# Patient Record
Sex: FEMALE | Race: BLACK OR AFRICAN AMERICAN | URBAN - METROPOLITAN AREA
[De-identification: names, ages, dates, MRNs, and addresses within clinical notes are randomized per-mention and may not be internally consistent; named-entity substitution may affect disease eponyms.]

---

## 2018-02-19 ENCOUNTER — OFFICE VISIT (OUTPATIENT)
Dept: MIDWIFE SERVICES | Age: 28
End: 2018-02-19

## 2018-02-19 VITALS
WEIGHT: 172.5 LBS | DIASTOLIC BLOOD PRESSURE: 81 MMHG | BODY MASS INDEX: 27.72 KG/M2 | HEIGHT: 66 IN | SYSTOLIC BLOOD PRESSURE: 138 MMHG | HEART RATE: 82 BPM

## 2018-02-19 DIAGNOSIS — N63.0 LUMP IN FEMALE BREAST: Primary | ICD-10-CM

## 2018-02-19 NOTE — PROGRESS NOTES
[facility] presents for evaluation of a lump she found in her right breast.  She noticed it first on Thursday because the area was tender. She is currently using condoms for birth control and is happy with that method. [facility] was unable to find mass during the appointment. Breasts very fibrous. Small lump found on right breast at 8'oclock. Likely small cyst.    US scheduled  Return with any further concerns.

## 2018-02-19 NOTE — PROGRESS NOTES
Chief Complaint   Patient presents with    Breast Mass     right     Blood pressure 138/81, pulse 82, height 5' 6\" (1.676 m), weight 172 lb 8 oz (78.2 kg), last menstrual period 01/22/2018. 1. Have you been to the ER, urgent care clinic since your last visit? Hospitalized since your last visit? No    2. Have you seen or consulted any other health care providers outside of the [address] [place] [address] since your last visit? Include any pap smears or colon screening.  No     Here today for right breast lump

## 2018-02-19 NOTE — MR AVS SNAPSHOT
[address] [address] [address] [phone] Patient: Liliana Stanley MRN: [id number] Gerardo Darden Visit Information Date & Time Provider Department Dept. Phone Encounter #  
 2/19/2018  3:00 PM [facility] [address] [id number] Upcoming Health Maintenance Date Due  
 HPV AGE 9Y-34Y (1 of 3 - Female 3 Dose Series) 8/18/2002 Influenza Age 5 to Adult 8/1/2017 PAP AKA CERVICAL CYTOLOGY 3/19/2018 Allergies as of 2/19/2018  Review Complete On: 2/19/2018 By: Valeri Palacios RN Severity Noted Reaction Type Reactions Flagyl [Metronidazole] Medium 03/12/2013   Systemic Rash Current Immunizations  Never Reviewed No immunizations on file. Not reviewed this visit You Were Diagnosed With   
  
 Codes Comments Lump in female breast    -  Primary ICD-10-CM: N63.0 ICD-9-CM: 611.72 Vitals BP Pulse Height(growth percentile) Weight(growth percentile) LMP BMI  
 138/81 82 5' 6\" (1.676 m) 172 lb 8 oz (78.2 kg) 01/22/2018 (Approximate) 27.84 kg/m2 OB Status Smoking Status Unknown Current Every Day Smoker BMI and BSA Data Body Mass Index Body Surface Area  
 27.84 kg/m 2 1.91 m 2 Preferred Pharmacy Pharmacy Name Phone [id number], [address] [place] [address] Your Updated Medication List  
  
   
This list is accurate as of: 2/19/18  3:51 PM.  Always use your most recent med list.  
  
  
  
  
 ALBUTEROL IN Take  by inhalation. betamethasone valerate 0.1 % ointment Commonly known as:  Elsa Kenning Apply  to affected area two (2) times a day. CLARITIN 10 mg tablet Generic drug:  loratadine Take 10 mg by mouth daily. FLONASE 50 mcg/actuation nasal spray Generic drug:  fluticasone  
nightly. SYMBICORT 160-4.5 mcg/actuation Hfaa Generic drug:  budesonide-formoterol Take 2 Puffs by inhalation two (2) times a day. To-Do List   
 02/26/2018 Imaging:  US BREAST RT COMPLETE 4 QUAD Introducing [facility]! Dear Emre: Thank you for requesting a afterBOT account. Our records indicate that you already have an active afterBOT account. You can access your account anytime at [email or web address] Did you know that you can access your hospital and ER discharge instructions at any time in afterBOT? You can also review all of your test results from your hospital stay or ER visit. Additional Information If you have questions, please visit the Frequently Asked Questions section of the afterBOT website at [email or web address] Remember, afterBOT is NOT to be used for urgent needs. For medical emergencies, dial 911. Now available from your iPhone and Android! Please provide this summary of care documentation to your next provider. If you have any questions after today's visit, please call [phone].

## 2019-04-15 ENCOUNTER — OFFICE VISIT (OUTPATIENT)
Dept: PRIMARY CARE CLINIC | Age: 29
End: 2019-04-15

## 2019-04-15 VITALS
BODY MASS INDEX: 28.51 KG/M2 | RESPIRATION RATE: 16 BRPM | HEART RATE: 69 BPM | TEMPERATURE: 98.2 F | HEIGHT: 66 IN | WEIGHT: 177.4 LBS | OXYGEN SATURATION: 99 % | DIASTOLIC BLOOD PRESSURE: 86 MMHG | SYSTOLIC BLOOD PRESSURE: 128 MMHG

## 2019-04-15 DIAGNOSIS — Z23 NEED FOR DIPHTHERIA-TETANUS-PERTUSSIS (TDAP) VACCINE: ICD-10-CM

## 2019-04-15 DIAGNOSIS — M06.9 RHEUMATOID ARTHRITIS INVOLVING BOTH KNEES, UNSPECIFIED RHEUMATOID FACTOR PRESENCE: Primary | ICD-10-CM

## 2019-04-15 DIAGNOSIS — J30.89 ENVIRONMENTAL AND SEASONAL ALLERGIES: ICD-10-CM

## 2019-04-15 DIAGNOSIS — Z12.4 CERVICAL CANCER SCREENING: ICD-10-CM

## 2019-04-15 DIAGNOSIS — J45.20 MILD INTERMITTENT ASTHMA WITHOUT COMPLICATION: ICD-10-CM

## 2019-04-15 DIAGNOSIS — Z20.2 STD EXPOSURE: ICD-10-CM

## 2019-04-15 DIAGNOSIS — Z00.00 PHYSICAL EXAM: ICD-10-CM

## 2019-04-15 RX ORDER — ALBUTEROL SULFATE 90 UG/1
1 AEROSOL, METERED RESPIRATORY (INHALATION)
Qty: 1 INHALER | Refills: 0 | Status: SHIPPED | OUTPATIENT
Start: 2019-04-15 | End: 2019-05-15

## 2019-04-15 NOTE — PROGRESS NOTES
Visit Vitals /86 (BP 1 Location: Left arm, BP Patient Position: Sitting) Pulse 69 Temp 98.2 °F (36.8 °C) (Oral) Resp 16 Ht 5' 6\" (1.676 m) Wt 177 lb 6.4 oz (80.5 kg) SpO2 99% BMI 28.63 kg/m² Chief Complaint Patient presents with [address] [place] [address] [facility] Needs a new PCP  Other Needs a referral to Rheumatology  Exposure to STD Patient isn't sure if her boyfriend is STD Free 1. Have you been to the ER, urgent care clinic since your last visit? Hospitalized since your last visit? Denies 2. Have you seen or consulted any other health care providers outside of the [address] [place] [address] since your last visit? Include any pap smears or colon screening. Denies

## 2019-04-15 NOTE — PROGRESS NOTES
Written by Chase Andujar, as dictated by Dr. Ron Rosas MD. 
 
[address] [facility] Ute Clark is a 33 y.o. female. HPI The patient comes in today to establish care. Patient would like to be checked for STDs as she recently broke up with her boyfriend and is concerned about STDs She has a HX of RA and was diagnosed with juvenile RA at 11-7 years old. Pt had been under the care of rheumatology at [facility]. She was on enbrel , steroids, but has stopped taking medication and is not seeing a rheumatologist at this time. She has been having stiff joints, especially in the morning. Pt has a HX of asthma, which usually flares up around this time of year. She uses albuterol inhaler as needed. She is not taking anything on a daily basis. She does take OTC Allegra or Zyrtec. She would also like to have her blood work done as she is fasting and wants physical exam as well. Patient would like to establish with a new OB/GYN for her pap smear. Patient Active Problem List  
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  budesonide-formoterol (SYMBICORT) 160-4.5 mcg/actuation HFA inhaler Take 2 Puffs by inhalation two (2) times a day.  loratadine (CLARITIN) 10 mg tablet Take 10 mg by mouth daily.  betamethasone valerate (VALISONE) 0.1 % ointment Apply  to affected area two (2) times a day. 15 g 0 No current facility-administered medications on file prior to visit. Allergies Allergen Reactions  Flagyl [Metronidazole] Rash Past Medical History:  
Diagnosis Date  Arthritis  Asthma Past Surgical History:  
Procedure Laterality Date  HX CYSTECTOMY  2012 Family History Problem Relation Age of Onset  Hypertension Father  Asthma Sister  Asthma Brother  Cancer Maternal Grandmother  Breast Cancer Paternal Grandmother Social History Socioeconomic History  Marital status: SINGLE Spouse name: Not on file  Number of children: Not on file  Years of education: Not on file  Highest education level: Not on file Occupational History  Not on file Social Needs  Financial resource strain: Not on file  Food insecurity:  
  Worry: Not on file Inability: Not on file  Transportation needs:  
  Medical: Not on file Non-medical: Not on file Tobacco Use  Smoking status: Current Every Day Smoker Packs/day: 0.25 Years: 6.00 Pack years: 1.50  Smokeless tobacco: Never Used  Tobacco comment: Uses marijuana daily Substance and Sexual Activity  Alcohol use: Yes Alcohol/week: 1.2 - 1.8 oz Types: 2 - 3 Standard drinks or equivalent per week Comment: occassionally  Drug use: Yes Types: Marijuana  Sexual activity: Yes  
  Partners: Male Birth control/protection: None, Condom Lifestyle  Physical activity:  
  Days per week: Not on file Minutes per session: Not on file  Stress: Not on file Relationships  Social connections:  
  Talks on phone: Not on file Gets together: Not on file Attends Mu-ism service: Not on file Active member of club or organization: Not on file Attends meetings of clubs or organizations: Not on file Relationship status: Not on file  Intimate partner violence:  
  Fear of current or ex partner: Not on file Emotionally abused: Not on file Physically abused: Not on file Forced sexual activity: Not on file Other Topics Concern  Not on file Social History Narrative  Not on file Review of Systems Constitutional: Negative for malaise/fatigue. HENT: Negative for congestion. Eyes: Negative for blurred vision and pain. Respiratory: Negative for cough and shortness of breath. Cardiovascular: Negative for chest pain and palpitations. Gastrointestinal: Negative for abdominal pain and heartburn. Genitourinary: Negative for frequency and urgency. Musculoskeletal: Positive for joint pain. Negative for myalgias. Neurological: Negative for dizziness, tingling, sensory change, weakness and headaches. Endo/Heme/Allergies: Positive for environmental allergies. Psychiatric/Behavioral: Negative for depression, memory loss and substance abuse. Visit Vitals /86 (BP 1 Location: Left arm, BP Patient Position: Sitting) Pulse 69 Temp 98.2 °F (36.8 °C) (Oral) Resp 16 Ht 5' 6\" (1.676 m) Wt 177 lb 6.4 oz (80.5 kg) LMP 03/18/2019 SpO2 99% BMI 28.63 kg/m² Physical Exam  
Constitutional: She is oriented to person, place, and time. She appears well-developed and well-nourished. No distress. HENT:  
Right Ear: External ear normal.  
Left Ear: External ear normal.  
Eyes: Conjunctivae and EOM are normal. Right eye exhibits no discharge. Left eye exhibits no discharge. Neck: Normal range of motion. Neck supple. Cardiovascular: Normal rate and regular rhythm. Pulses: 
     Dorsalis pedis pulses are 2+ on the right side, and 2+ on the left side. Pulmonary/Chest: Effort normal and breath sounds normal. She has no wheezes. Abdominal: Soft. Bowel sounds are normal. There is no tenderness. Lymphadenopathy:  
  She has no cervical adenopathy. Neurological: She is alert and oriented to person, place, and time. Reflex Scores: 
     Patellar reflexes are 2+ on the right side and 2+ on the left side. Skin: She is not diaphoretic. Psychiatric: She has a normal mood and affect. Her behavior is normal.  
Nursing note and vitals reviewed. ASSESSMENT and PLAN 
  ICD-10-CM ICD-9-CM 1. Rheumatoid arthritis involving both knees, unspecified rheumatoid factor presence ([facility] M06.9 714.0 REFERRAL TO RHEUMATOLOGY Referred to rheumatology. Told her that she will most likely have blood work done before she starts new therapy. She should make an appointment. 2. Need for diphtheria-tetanus-pertussis (Tdap) vaccine Z23 V06.1 diph,Pertuss,Acell,,Tet Vac-PF (ADACEL) 2 Lf-(2.5-5-3-5 mcg)-5Lf/0.5 mL susp prescription given. Tdap ordered. Last Tdap was when she was in high school. 3. Mild intermittent asthma without complication H19.28 441.43 albuterol (PROVENTIL HFA, VENTOLIN HFA, PROAIR HFA) 90 mcg/actuation inhaler sent to pharmacy. Albuterol inhaler given, which she should use as needed. Will check her for allergies. 4. Environmental and seasonal allergies J30.89 477.8 ALLERGEN (24) PANEL  
   ALLERGEN (24) PANEL Told her to take OTC Zyrtec or Claritin daily. If she still has issues, I would suggest taking Singulair. 5. Physical exam B03.91 R52.9 METABOLIC PANEL, COMPREHENSIVE  
   CBC W/O DIFF  
   LIPID PANEL  
   TSH 3RD GENERATION  
   VITAMIN D, 25 HYDROXY Complete physical exam done. Basic fasting labs drawn. 6. STD exposure Z20.2 V01.6 HIV 1/2 AG/AB, 4TH GENERATION,W RFLX CONFIRM  
   CT+NG+M GENITALIUM BY YOUNG, UR  
   RPR W/REFLEX TITER AND TREPONEMA ABS Will check for STDs. 7. Cervical cancer screening Z12.4 V76.2 REFERRAL TO OBSTETRICS AND GYNECOLOGY Referred to OB/GYN. This plan was reviewed with the patient and patient agrees. All questions were answered. This scribe documentation was reviewed by me and accurately reflects the examination and decisions made by me. This note will not be viewable in [address]

## 2019-04-21 LAB
A ALTERNATA IGE QN: 0.72 KU/L
A FUMIGATUS IGE QN: 0.15 KU/L
BERMUDA GRASS IGE QN: 1.56 KU/L
BOXELDER IGE QN: 0.48 KU/L
C HERBARUM IGE QN: 0.49 KU/L
CAT DANDER IGE QN: 10.4 KU/L
CMN PIGWEED IGE QN: 0.64 KU/L
COMMON RAGWEED IGE QN: 1 KU/L
COTTONWOOD IGE QN: 0.73 KU/L
D FARINAE IGE QN: <0.1 KU/L
D PTERONYSS IGE QN: <0.1 KU/L
DOG DANDER IGE QN: 6.59 KU/L
IGE SERPL-ACNC: 100 IU/ML (ref 6–495)
JOHNSON GRASS IGE QN: 2.15 KU/L
Lab: ABNORMAL
MOUSE URINE PROT IGE QN: <0.1 KU/L
MT JUNIPER IGE QN: 1.35 KU/L
P NOTATUM IGE QN: <0.1 KU/L
PECAN/HICK TREE IGE QN: 0.46 KU/L
ROACH IGE QN: 0.14 KU/L
SHEEP SORREL IGE QN: 0.35 KU/L
SILVER BIRCH IGE QN: 0.64 KU/L
TIMOTHY IGE QN: 8.76 KU/L
WHITE ELM IGE QN: 1.31 KU/L
WHITE MULBERRY IGE QN: 0.11 KU/L
WHITE OAK IGE QN: 0.62 KU/L

## 2019-04-26 LAB
25(OH)D3+25(OH)D2 SERPL-MCNC: 10.5 NG/ML (ref 30–100)
ALBUMIN SERPL-MCNC: 4.4 G/DL (ref 3.5–5.5)
ALBUMIN/GLOB SERPL: 1.6 {RATIO} (ref 1.2–2.2)
ALP SERPL-CCNC: 59 IU/L (ref 39–117)
ALT SERPL-CCNC: 11 IU/L (ref 0–32)
AST SERPL-CCNC: 16 IU/L (ref 0–40)
BILIRUB SERPL-MCNC: 0.3 MG/DL (ref 0–1.2)
BUN SERPL-MCNC: 8 MG/DL (ref 6–20)
BUN/CREAT SERPL: 11 (ref 9–23)
C TRACH RRNA UR QL NAA+PROBE: NEGATIVE
CALCIUM SERPL-MCNC: 9 MG/DL (ref 8.7–10.2)
CHLORIDE SERPL-SCNC: 106 MMOL/L (ref 96–106)
CHOLEST SERPL-MCNC: 154 MG/DL (ref 100–199)
CO2 SERPL-SCNC: 24 MMOL/L (ref 20–29)
COMMENT, 180044: NORMAL
CREAT SERPL-MCNC: 0.75 MG/DL (ref 0.57–1)
ERYTHROCYTE [DISTWIDTH] IN BLOOD BY AUTOMATED COUNT: 13.4 % (ref 12.3–15.4)
GLOBULIN SER CALC-MCNC: 2.7 G/DL (ref 1.5–4.5)
GLUCOSE SERPL-MCNC: 79 MG/DL (ref 65–99)
HCT VFR BLD AUTO: 44.5 % (ref 34–46.6)
HDLC SERPL-MCNC: 52 MG/DL
HGB BLD-MCNC: 14.4 G/DL (ref 11.1–15.9)
HIV 1+2 AB+HIV1 P24 AG SERPL QL IA: NON REACTIVE
LDLC SERPL CALC-MCNC: 88 MG/DL (ref 0–99)
M GENITALIUM DNA SPEC QL NAA+PROBE: NEGATIVE
MCH RBC QN AUTO: 30.1 PG (ref 26.6–33)
MCHC RBC AUTO-ENTMCNC: 32.4 G/DL (ref 31.5–35.7)
MCV RBC AUTO: 93 FL (ref 79–97)
N GONORRHOEA RRNA UR QL NAA+PROBE: NEGATIVE
PLATELET # BLD AUTO: 201 X10E3/UL (ref 150–379)
POTASSIUM SERPL-SCNC: 4 MMOL/L (ref 3.5–5.2)
PROT SERPL-MCNC: 7.1 G/DL (ref 6–8.5)
RBC # BLD AUTO: 4.79 X10E6/UL (ref 3.77–5.28)
RPR SER QL: NON REACTIVE
SODIUM SERPL-SCNC: 143 MMOL/L (ref 134–144)
TRIGL SERPL-MCNC: 71 MG/DL (ref 0–149)
TSH SERPL DL<=0.005 MIU/L-ACNC: 1.15 UIU/ML (ref 0.45–4.5)
VLDLC SERPL CALC-MCNC: 14 MG/DL (ref 5–40)
WBC # BLD AUTO: 6.1 X10E3/UL (ref 3.4–10.8)

## 2019-04-28 NOTE — PROGRESS NOTES
Roxanna Antony, your allergy test result showed you are allergic to lots of things. Are your allergies pretty bad right now? You can make an appointment will discuss treatment options. Your vitamin D came back very low. Please start taking over the counter vitamin D 3 1000 I.u daily dose.

## 2019-05-09 ENCOUNTER — OFFICE VISIT (OUTPATIENT)
Dept: PRIMARY CARE CLINIC | Age: 29
End: 2019-05-09

## 2019-05-09 VITALS
SYSTOLIC BLOOD PRESSURE: 133 MMHG | HEIGHT: 66 IN | RESPIRATION RATE: 16 BRPM | DIASTOLIC BLOOD PRESSURE: 81 MMHG | BODY MASS INDEX: 27.64 KG/M2 | OXYGEN SATURATION: 99 % | WEIGHT: 172 LBS | HEART RATE: 71 BPM | TEMPERATURE: 97.3 F

## 2019-05-09 DIAGNOSIS — J45.909 ASTHMA DUE TO ENVIRONMENTAL ALLERGIES: ICD-10-CM

## 2019-05-09 DIAGNOSIS — E55.9 VITAMIN D DEFICIENCY: ICD-10-CM

## 2019-05-09 DIAGNOSIS — J30.89 ENVIRONMENTAL AND SEASONAL ALLERGIES: Primary | ICD-10-CM

## 2019-05-09 RX ORDER — ERGOCALCIFEROL 1.25 MG/1
50000 CAPSULE ORAL
Qty: 4 CAP | Refills: 0 | Status: SHIPPED | OUTPATIENT
Start: 2019-05-09 | End: 2019-05-31

## 2019-05-09 RX ORDER — MONTELUKAST SODIUM 10 MG/1
10 TABLET ORAL DAILY
Qty: 30 TAB | Refills: 0 | Status: SHIPPED | OUTPATIENT
Start: 2019-05-09 | End: 2019-06-08

## 2019-05-09 NOTE — PROGRESS NOTES
Written by Jorge Luis Benton, as dictated by Dr. Beti Beatty MD. 
 
[address] [place] [address] Rick Julian is a 33 y.o. female. HPI The patient presents today to discuss lab results. Labs were drawn on 04/15/2019: Discussed allergy results, explaining that she has lots of environmental allergies. Vitamin D was low at 10.5. CMP, CBC, lipid panel, and TSH were normal. HIV, STI,  RPR were negative. Multiple allergies positive on blood work. She is having seasonal allergies, which have been bothering her. Pt has been taking OTC Claritin and using Flonase, but notes that Claritin does not seem to provide significant relief. She has not tried Zyrtec. Compliant on Symbicort inhaler. Patient notes that she has been taking OTC vitamin D daily and has started sitting out in the sun more. Patient Active Problem List  
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 1 Inhaler 0  
 fluticasone (FLONASE) 50 mcg/actuation nasal spray nightly.  budesonide-formoterol (SYMBICORT) 160-4.5 mcg/actuation HFA inhaler Take 2 Puffs by inhalation two (2) times a day.  loratadine (CLARITIN) 10 mg tablet Take 10 mg by mouth daily.  betamethasone valerate (VALISONE) 0.1 % ointment Apply  to affected area two (2) times a day. 15 g 0 No current facility-administered medications on file prior to visit. Allergies Allergen Reactions  Flagyl [Metronidazole] Rash Past Medical History:  
Diagnosis Date  Arthritis  Asthma Past Surgical History:  
Procedure Laterality Date  HX CYSTECTOMY  2012 Family History Problem Relation Age of Onset  Hypertension Father  Asthma Sister  Asthma Brother  Cancer Maternal Grandmother  Breast Cancer Paternal Grandmother Social History Socioeconomic History  Marital status: SINGLE Spouse name: Not on file  Number of children: Not on file  Years of education: Not on file  Highest education level: Not on file Occupational History  Not on file Social Needs  Financial resource strain: Not on file  Food insecurity:  
  Worry: Not on file Inability: Not on file  Transportation needs:  
  Medical: Not on file Non-medical: Not on file Tobacco Use  Smoking status: Current Every Day Smoker Packs/day: 0.25 Years: 6.00 Pack years: 1.50  Smokeless tobacco: Never Used  Tobacco comment: Uses marijuana daily Substance and Sexual Activity  Alcohol use: Yes Alcohol/week: 1.2 - 1.8 oz Types: 2 - 3 Standard drinks or equivalent per week Comment: occassionally  Drug use: Yes Types: Marijuana  Sexual activity: Yes  
  Partners: Male Birth control/protection: None, Condom Lifestyle  Physical activity:  
  Days per week: Not on file Minutes per session: Not on file  Stress: Not on file Relationships  Social connections:  
  Talks on phone: Not on file Gets together: Not on file Attends Jehovah's witness service: Not on file Active member of club or organization: Not on file Attends meetings of clubs or organizations: Not on file Relationship status: Not on file  Intimate partner violence:  
  Fear of current or ex partner: Not on file Emotionally abused: Not on file Physically abused: Not on file Forced sexual activity: Not on file Other Topics Concern  Not on file Social History Narrative  Not on file Office Visit on 04/15/2019 Component Date Value Ref Range Status  Glucose 04/15/2019 79  65 - 99 mg/dL Final  
 BUN 04/15/2019 8  6 - 20 mg/dL Final  
 Creatinine 04/15/2019 0.75  0.57 - 1.00 mg/dL Final  
  GFR est non-AA 04/15/2019 110  >59 mL/min/1.73 Final  
 GFR est AA 04/15/2019 126  >59 mL/min/1.73 Final  
 BUN/Creatinine ratio 04/15/2019 11  9 - 23 Final  
 Sodium 04/15/2019 143  134 - 144 mmol/L Final  
 Potassium 04/15/2019 4.0  3.5 - 5.2 mmol/L Final  
 Chloride 04/15/2019 106  96 - 106 mmol/L Final  
 CO2 04/15/2019 24  20 - 29 mmol/L Final  
 Calcium 04/15/2019 9.0  8.7 - 10.2 mg/dL Final  
 Protein, total 04/15/2019 7.1  6.0 - 8.5 g/dL Final  
 Albumin 04/15/2019 4.4  3.5 - 5.5 g/dL Final  
 GLOBULIN, TOTAL 04/15/2019 2.7  1.5 - 4.5 g/dL Final  
 A-G Ratio 04/15/2019 1.6  1.2 - 2.2 Final  
 Bilirubin, total 04/15/2019 0.3  0.0 - 1.2 mg/dL Final  
 Alk.  phosphatase 04/15/2019 59  39 - 117 IU/L Final  
 AST (SGOT) 04/15/2019 16  0 - 40 IU/L Final  
 ALT (SGPT) 04/15/2019 11  0 - 32 IU/L Final  
 WBC 04/15/2019 6.1  3.4 - 10.8 x10E3/uL Final  
 RBC 04/15/2019 4.79  3.77 - 5.28 x10E6/uL Final  
 HGB 04/15/2019 14.4  11.1 - 15.9 g/dL Final  
 HCT 04/15/2019 44.5  34.0 - 46.6 % Final  
 MCV 04/15/2019 93  79 - 97 fL Final  
 MCH 04/15/2019 30.1  26.6 - 33.0 pg Final  
 MCHC 04/15/2019 32.4  31.5 - 35.7 g/dL Final  
 RDW 04/15/2019 13.4  12.3 - 15.4 % Final  
 PLATELET 30/88/4326 261  150 - 379 x10E3/uL Final  
 Cholesterol, total 04/15/2019 154  100 - 199 mg/dL Final  
 Triglyceride 04/15/2019 71  0 - 149 mg/dL Final  
 HDL Cholesterol 04/15/2019 52  >39 mg/dL Final  
 VLDL, calculated 04/15/2019 14  5 - 40 mg/dL Final  
 LDL, calculated 04/15/2019 88  0 - 99 mg/dL Final  
 TSH 04/15/2019 1.150  0.450 - 4.500 uIU/mL Final  
 VITAMIN D, 25-HYDROXY 04/15/2019 10.5* 30.0 - 100.0 ng/mL Final  
 HIV SCREEN 4TH GENERATION WRFX 04/15/2019 Non Reactive  Non Reactive Final  
 M. genitalium by YOUNG 04/15/2019 Negative  Negative Final  
 Comment 04/15/2019 Comment   Final  
 C. trachomatis by YOUNG 04/15/2019 Negative  Negative Final  
  N. gonorrhoeae by YOUNG 04/15/2019 Negative  Negative Final  
 RPR 04/15/2019 Non Reactive  Non Reactive Final  
 Immunoglobulin E 04/15/2019 100  6 - 495 IU/mL Final  
 D. pteronyssinus 04/15/2019 <0.10  Class 0 kU/L Final  
 D. farinae Mite 04/15/2019 <0.10  Class 0 kU/L Final  
 Cat Hair/Dander 04/15/2019 10.40* Class IV kU/L Final  
 Dog Hair/Dander 04/15/2019 6.59* Class IV kU/L Final  
 Bermuda Grass 04/15/2019 1.56* Class III kU/L Final  
 Nhan grass 04/15/2019 8.76* Class IV kU/L Final  
 Darien Grass 04/15/2019 2.15* Class III kU/L Final  
 Cockroach, Cape Verdean 04/15/2019 0.14* Class 0/I kU/L Final  
 Penicillium notatum 04/15/2019 <0.10  Class 0 kU/L Final  
 Cladosporium herbarum 04/15/2019 0.49* Class I kU/L Final  
 Aspergillus fumigatus 04/15/2019 0.15* Class 0/I kU/L Final  
 Alternaria tenuis 04/15/2019 0.72* Class II kU/L Final  
 Maple/Box Elder 04/15/2019 0.48* Class I kU/L Final  
 P461-[id number] COMMON SILVER BIRCH 04/15/2019 0.64* Class II kU/L Final  
[facility] 04/15/2019 1.35* Class II kU/L Final  
 Electra 04/15/2019 0.62* Class II kU/L Final  
 American White Elm 04/15/2019 1.31* Class II kU/L Final  
 [place] Tree 04/15/2019 0.73* Class II kU/L Final  
 Pecan Tree 04/15/2019 0.46* Class I kU/L Final  
 White [place]dara 04/15/2019 0.11* Class 0/I kU/L Final  
 Ragweed, Short/Common 04/15/2019 1.00* Class II kU/L Final  
 Pigweed, Rough 04/15/2019 0.64* Class II kU/L Final  
 Sheep [facility] [facility] 04/15/2019 0.35* Class I kU/L Final  
 Mouse urine 04/15/2019 <0.10  Class 0 kU/L Final  
 
 
Review of Systems Respiratory: Negative for cough and shortness of breath. Musculoskeletal: Negative for joint pain and myalgias. Neurological: Negative for dizziness, tingling, sensory change, weakness and headaches. Endo/Heme/Allergies: Positive for environmental allergies. Psychiatric/Behavioral: Negative for depression, memory loss and substance abuse. Visit Vitals /81 (BP 1 Location: Left arm, BP Patient Position: Sitting) Pulse 71 Temp 97.3 °F (36.3 °C) (Oral) Resp 16 Ht 5' 6\" (1.676 m) Wt 172 lb (78 kg) LMP 04/12/2019 SpO2 99% BMI 27.76 kg/m² Physical Exam  
Constitutional: She is oriented to person, place, and time. She appears well-developed and well-nourished. No distress. HENT:  
Right Ear: External ear normal.  
Left Ear: External ear normal.  
Eyes: Conjunctivae and EOM are normal. Right eye exhibits no discharge. Left eye exhibits no discharge. Neck: Normal range of motion. Neck supple. Cardiovascular: Normal rate and regular rhythm. Pulmonary/Chest: Effort normal and breath sounds normal. She has no wheezes. Abdominal: Soft. Bowel sounds are normal.  
Lymphadenopathy:  
  She has no cervical adenopathy. Neurological: She is alert and oriented to person, place, and time. Psychiatric: She has a normal mood and affect. Nursing note and vitals reviewed. ASSESSMENT and PLAN 
  ICD-10-CM ICD-9-CM 1. Environmental and seasonal allergies J30.89 477.8 montelukast (SINGULAIR) 10 mg tablet sent to pharmacy. Labs reviewed. Told her about dog & cat allergies she needs to come up with the plan how to avoid contact. If symptoms not improve with Singular , will refer to Allergist/Immunologist.  
 
Singulair 10 mg prescribed. Potential side effects were discussed. She should take 1 tab PO daily. 2. Vitamin D deficiency E55.9 268.9 ergocalciferol (ERGOCALCIFEROL) 50,000 unit capsule sent to pharmacy. Ergocalciferol 50,000 iu prescribed. Potential side effects were discussed. She should take 1 tab PO once per week x 4 weeks. After finishing the prescription, she should resume OTC 1,000 iu vitamin D3 daily. 3. Asthma due to environmental allergies J45.909 493.90 Patient should continue Symbicort inhaler as needed. This plan was reviewed with the patient and patient agrees. All questions were answered. This scribe documentation was reviewed by me and accurately reflects the examination and decisions made by me. This note will not be viewable in [address]

## 2019-05-09 NOTE — PROGRESS NOTES
Visit Vitals /81 (BP 1 Location: Left arm, BP Patient Position: Sitting) Pulse 71 Temp 97.3 °F (36.3 °C) (Oral) Resp 16 Ht 5' 6\" (1.676 m) Wt 172 lb (78 kg) SpO2 99% BMI 27.76 kg/m² Chief Complaint Patient presents with  Allergies 1. Have you been to the ER, urgent care clinic since your last visit? Hospitalized since your last visit? Denies 2. Have you seen or consulted any other health care providers outside of the [address] [place] [address] since your last visit? Include any pap smears or colon screening. Denies

## 2019-08-06 DIAGNOSIS — F40.243 ANXIETY WITH FLYING: Primary | ICD-10-CM

## 2019-08-06 RX ORDER — ALPRAZOLAM 0.5 MG/1
0.5 TABLET ORAL
Qty: 5 TAB | Refills: 0 | Status: SHIPPED | OUTPATIENT
Start: 2019-08-06 | End: 2019-08-08 | Stop reason: SDUPTHER

## 2019-08-08 ENCOUNTER — DOCUMENTATION ONLY (OUTPATIENT)
Dept: PRIMARY CARE CLINIC | Age: 29
End: 2019-08-08

## 2019-08-08 DIAGNOSIS — F40.243 ANXIETY WITH FLYING: ICD-10-CM

## 2019-08-08 RX ORDER — ALPRAZOLAM 0.5 MG/1
0.5 TABLET ORAL
Qty: 5 TAB | Refills: 0 | Status: SHIPPED | OUTPATIENT
Start: 2019-08-08

## 2023-05-25 RX ORDER — BUDESONIDE AND FORMOTEROL FUMARATE DIHYDRATE 160; 4.5 UG/1; UG/1
2 AEROSOL RESPIRATORY (INHALATION) 2 TIMES DAILY
COMMUNITY

## 2023-05-25 RX ORDER — ALPRAZOLAM 0.5 MG/1
0.5 TABLET ORAL
COMMUNITY
Start: 2019-08-08

## 2023-05-25 RX ORDER — LORATADINE 10 MG/1
10 TABLET ORAL DAILY
COMMUNITY

## 2023-05-25 RX ORDER — FLUTICASONE PROPIONATE 50 MCG
SPRAY, SUSPENSION (ML) NASAL
COMMUNITY